# Patient Record
Sex: FEMALE | Race: BLACK OR AFRICAN AMERICAN | NOT HISPANIC OR LATINO | Employment: OTHER | URBAN - METROPOLITAN AREA
[De-identification: names, ages, dates, MRNs, and addresses within clinical notes are randomized per-mention and may not be internally consistent; named-entity substitution may affect disease eponyms.]

---

## 2023-12-12 ENCOUNTER — OFFICE VISIT (OUTPATIENT)
Dept: OBSTETRICS AND GYNECOLOGY | Facility: CLINIC | Age: 78
End: 2023-12-12
Payer: MEDICARE

## 2023-12-12 ENCOUNTER — TELEPHONE (OUTPATIENT)
Dept: OBSTETRICS AND GYNECOLOGY | Facility: CLINIC | Age: 78
End: 2023-12-12

## 2023-12-12 VITALS
HEIGHT: 63 IN | SYSTOLIC BLOOD PRESSURE: 120 MMHG | DIASTOLIC BLOOD PRESSURE: 60 MMHG | BODY MASS INDEX: 26.4 KG/M2 | WEIGHT: 149 LBS

## 2023-12-12 DIAGNOSIS — Z12.31 ENCOUNTER FOR SCREENING MAMMOGRAM FOR MALIGNANT NEOPLASM OF BREAST: ICD-10-CM

## 2023-12-12 DIAGNOSIS — Z01.419 ENCOUNTER FOR GYNECOLOGICAL EXAMINATION WITHOUT ABNORMAL FINDING: Primary | ICD-10-CM

## 2023-12-12 PROCEDURE — 1036F TOBACCO NON-USER: CPT | Performed by: OBSTETRICS & GYNECOLOGY

## 2023-12-12 PROCEDURE — 1159F MED LIST DOCD IN RCRD: CPT | Performed by: OBSTETRICS & GYNECOLOGY

## 2023-12-12 PROCEDURE — 99214 OFFICE O/P EST MOD 30 MIN: CPT | Performed by: OBSTETRICS & GYNECOLOGY

## 2023-12-12 RX ORDER — NAPROXEN SODIUM 220 MG/1
TABLET, FILM COATED ORAL EVERY 24 HOURS
COMMUNITY

## 2023-12-12 RX ORDER — CLOTRIMAZOLE AND BETAMETHASONE DIPROPIONATE 10; .64 MG/G; MG/G
CREAM TOPICAL 2 TIMES DAILY
COMMUNITY
Start: 2021-12-07

## 2023-12-12 RX ORDER — DENOSUMAB 60 MG/ML
INJECTION SUBCUTANEOUS
COMMUNITY
Start: 2017-12-11

## 2023-12-12 RX ORDER — CETIRIZINE HYDROCHLORIDE 10 MG/1
10 TABLET ORAL
COMMUNITY
Start: 2023-09-10

## 2023-12-12 RX ORDER — VIT C/E/ZN/COPPR/LUTEIN/ZEAXAN 250MG-90MG
1000 CAPSULE ORAL
COMMUNITY

## 2023-12-12 RX ORDER — GARLIC 1000 MG
1 CAPSULE ORAL
COMMUNITY

## 2023-12-12 RX ORDER — SIMVASTATIN 20 MG/1
20 TABLET, FILM COATED ORAL
COMMUNITY
Start: 2023-12-05 | End: 2024-03-04

## 2023-12-12 RX ORDER — AMLODIPINE AND BENAZEPRIL HYDROCHLORIDE 10; 20 MG/1; MG/1
1 CAPSULE ORAL
COMMUNITY
Start: 2013-09-12 | End: 2024-03-04

## 2023-12-12 RX ORDER — TRIAMCINOLONE ACETONIDE 1 MG/G
OINTMENT TOPICAL
COMMUNITY
Start: 2023-09-10

## 2023-12-12 RX ORDER — DILTIAZEM HYDROCHLORIDE 240 MG/1
240 CAPSULE, COATED, EXTENDED RELEASE ORAL
COMMUNITY
Start: 2023-12-05 | End: 2024-03-04

## 2023-12-12 RX ORDER — HYDROXYZINE HYDROCHLORIDE 25 MG/1
TABLET, FILM COATED ORAL
COMMUNITY

## 2023-12-12 ASSESSMENT — ENCOUNTER SYMPTOMS
OCCASIONAL FEELINGS OF UNSTEADINESS: 0
LOSS OF SENSATION IN FEET: 0
DEPRESSION: 0

## 2023-12-17 NOTE — PROGRESS NOTES
Subjective   Heidi Evans is a 78 y.o. female here for GYN care.   Current complaints: She has a history of osteoporosis that is managed with Prolia.  Her last dose was July 17, 2023.  She has a bone density in April 2023 that shows a T-score of the left hip -2.4.  It had increased 4.4% compared to the prior exam.  Her spine had T-score is -1.8 which is a 19.1% increase.    She has no postmenopausal bleeding or pelvic pain.  No dysuria, no change in bowel habits or vaginal discharge.    She is current on her colonoscopy.  She has a history of a hysterectomy.  We discussed that she is also helping to care for her 97-year-old mother.. Personal health questionnaire reviewed: yes.     Gynecologic History  No LMP recorded (lmp unknown). Patient is postmenopausal.  Contraception: status post hysterectomy  Last Pap: n/a. Results were: normal  Last mammogram: 4/27/23. Results were: normal    Obstetric History  OB History   No obstetric history on file.       Objective   Constitutional: Alert and in no acute distress. Well developed, well nourished.   Head and Face: Head and face: Normal.    Eyes: Normal external exam - nonicteric sclera, extraocular movements intact (EOMI) and no ptosis.   Neck: No neck asymmetry. Supple. Thyroid not enlarged and there were no palpable thyroid nodules.    Pulmonary: No respiratory distress.   Chest: Breasts: Normal appearance, no nipple discharge and no skin changes. Palpation of breasts and axillae: No palpable mass and no axillary lymphadenopathy.   Abdomen: Soft nontender; no abdominal mass palpated. No organomegaly. No hernias.   Genitourinary: External genitalia: Normal. No inguinal lymphadenopathy. Bartholin's Urethral and Skenes Glands: Normal. Urethra: Normal.  Bladder: Normal on palpation. Vagina: Normal. Right Adnexa/parametria: Normal.  Left Adnexa/parametria: Normal.  Inspection of Perianal Area: Normal.   Musculoskeletal: No joint swelling seen, normal movements of all  extremities.   Skin: Normal skin color and pigmentation, normal skin turgor, and no rash.   Neurologic: Non-focal. Grossly intact.   Psychiatric: Alert and oriented x 3. Affect normal to patient baseline. Mood: Appropriate.  Physical Exam     Assessment/Plan   This is a 78-year-old female with osteoporosis.  She will be due for her Prolia in January 2024.  Her bone density test is due in April 2025.  I will see her in 1 year.    Education reviewed: self breast exams.

## 2024-01-19 ENCOUNTER — APPOINTMENT (OUTPATIENT)
Dept: OBSTETRICS AND GYNECOLOGY | Facility: CLINIC | Age: 79
End: 2024-01-19
Payer: MEDICARE

## 2024-01-23 ENCOUNTER — LAB (OUTPATIENT)
Dept: LAB | Facility: LAB | Age: 79
End: 2024-01-23
Payer: MEDICARE

## 2024-01-23 ENCOUNTER — PROCEDURE VISIT (OUTPATIENT)
Dept: OBSTETRICS AND GYNECOLOGY | Facility: CLINIC | Age: 79
End: 2024-01-23
Payer: MEDICARE

## 2024-01-23 DIAGNOSIS — M81.0 AGE RELATED OSTEOPOROSIS, UNSPECIFIED PATHOLOGICAL FRACTURE PRESENCE: Primary | ICD-10-CM

## 2024-01-23 DIAGNOSIS — M81.0 AGE RELATED OSTEOPOROSIS, UNSPECIFIED PATHOLOGICAL FRACTURE PRESENCE: ICD-10-CM

## 2024-01-23 DIAGNOSIS — Z12.31 VISIT FOR SCREENING MAMMOGRAM: ICD-10-CM

## 2024-01-23 LAB
25(OH)D3 SERPL-MCNC: 80 NG/ML (ref 30–100)
ANION GAP SERPL CALC-SCNC: 14 MMOL/L (ref 10–20)
BUN SERPL-MCNC: 17 MG/DL (ref 6–23)
CALCIUM SERPL-MCNC: 10 MG/DL (ref 8.6–10.6)
CHLORIDE SERPL-SCNC: 104 MMOL/L (ref 98–107)
CO2 SERPL-SCNC: 27 MMOL/L (ref 21–32)
CREAT SERPL-MCNC: 0.79 MG/DL (ref 0.5–1.05)
EGFRCR SERPLBLD CKD-EPI 2021: 77 ML/MIN/1.73M*2
GLUCOSE SERPL-MCNC: 94 MG/DL (ref 74–99)
POTASSIUM SERPL-SCNC: 4.6 MMOL/L (ref 3.5–5.3)
SODIUM SERPL-SCNC: 140 MMOL/L (ref 136–145)

## 2024-01-23 PROCEDURE — 80048 BASIC METABOLIC PNL TOTAL CA: CPT

## 2024-01-23 PROCEDURE — 96372 THER/PROPH/DIAG INJ SC/IM: CPT | Performed by: OBSTETRICS & GYNECOLOGY

## 2024-01-23 PROCEDURE — 36415 COLL VENOUS BLD VENIPUNCTURE: CPT

## 2024-01-23 PROCEDURE — 82306 VITAMIN D 25 HYDROXY: CPT

## 2024-01-23 NOTE — PROGRESS NOTES
Prolia 60 mg given left upper arm subQ lot [id number] exp 5/31/2026. Pt tolerated well, cb

## 2024-04-29 ENCOUNTER — HOSPITAL ENCOUNTER (OUTPATIENT)
Dept: RADIOLOGY | Facility: HOSPITAL | Age: 79
Discharge: HOME | End: 2024-04-29
Payer: MEDICARE

## 2024-04-29 VITALS — WEIGHT: 149 LBS | HEIGHT: 63 IN | BODY MASS INDEX: 26.4 KG/M2

## 2024-04-29 DIAGNOSIS — Z12.31 ENCOUNTER FOR SCREENING MAMMOGRAM FOR MALIGNANT NEOPLASM OF BREAST: ICD-10-CM

## 2024-04-29 DIAGNOSIS — Z01.419 ENCOUNTER FOR GYNECOLOGICAL EXAMINATION WITHOUT ABNORMAL FINDING: ICD-10-CM

## 2024-04-29 PROCEDURE — 77067 SCR MAMMO BI INCL CAD: CPT

## 2024-04-29 PROCEDURE — 77067 SCR MAMMO BI INCL CAD: CPT | Performed by: RADIOLOGY

## 2024-04-29 PROCEDURE — 77063 BREAST TOMOSYNTHESIS BI: CPT | Performed by: RADIOLOGY

## 2024-04-30 ENCOUNTER — HOSPITAL ENCOUNTER (OUTPATIENT)
Dept: RADIOLOGY | Facility: EXTERNAL LOCATION | Age: 79
Discharge: HOME | End: 2024-04-30
Payer: MEDICARE

## 2024-05-28 ENCOUNTER — HOSPITAL ENCOUNTER (OUTPATIENT)
Dept: RADIOLOGY | Facility: HOSPITAL | Age: 79
Discharge: HOME | End: 2024-05-28
Payer: MEDICARE

## 2024-05-28 ENCOUNTER — OFFICE VISIT (OUTPATIENT)
Dept: ORTHOPEDIC SURGERY | Facility: HOSPITAL | Age: 79
End: 2024-05-28
Payer: MEDICARE

## 2024-05-28 DIAGNOSIS — M25.512 LEFT SHOULDER PAIN, UNSPECIFIED CHRONICITY: Primary | ICD-10-CM

## 2024-05-28 DIAGNOSIS — M75.82 ROTATOR CUFF TENDONITIS, LEFT: ICD-10-CM

## 2024-05-28 DIAGNOSIS — M25.512 LEFT SHOULDER PAIN, UNSPECIFIED CHRONICITY: ICD-10-CM

## 2024-05-28 PROCEDURE — 73030 X-RAY EXAM OF SHOULDER: CPT | Mod: LEFT SIDE | Performed by: RADIOLOGY

## 2024-05-28 PROCEDURE — 99203 OFFICE O/P NEW LOW 30 MIN: CPT | Performed by: FAMILY MEDICINE

## 2024-05-28 PROCEDURE — 73030 X-RAY EXAM OF SHOULDER: CPT | Mod: LT

## 2024-05-28 PROCEDURE — 99213 OFFICE O/P EST LOW 20 MIN: CPT | Performed by: FAMILY MEDICINE

## 2024-05-28 NOTE — PROGRESS NOTES
Sports Medicine Office Note    Today's Date:  05/28/2024     HPI: Heidi vEans is a 78 y.o. RHD retired female who presents today for evaluation of left shoulder pain.      Today, 5/28/2024, she presents for evaluation of posterior lateral left shoulder pain that began approximately 6 weeks ago when she was helping her 98-year-old mother move homes.  She denies direct injury or trauma.  She has been treating it with Epsom salt bath soaks.  She denies taking any medications for this including APAP or ibuprofen.  She did see her PCP 1 week ago but there was no treatment for this given.  She denies any neck or right shoulder pain.  She denies radicular symptoms.    She has no other complaints.    Physical Examination:     The LEFT shoulder is without obvious signs of acute bony deformity, swelling, erythema or ecchymosis. There is no tenderness along the joint line. There is no tenderness at the AC joint. There is no tenderness at the SC joint. Active range of motion is nearly full, pain-free and symmetrical. Passive range of motion is full, pain-free and symmetrical. Impingement signs are positive with nears test, Garcia and crossover. Instability tests are negative with anterior and posterior drawer, sulcus, and apprehension with relocation test. Speeds test is negative. [place]'s test is negative. Rotator cuff strength is mildly weak with supraspinatus but otherwise full and pain-free. The neck and opposite shoulder are otherwise normal and stable.    Imaging:  Radiographs of the left shoulder obtained today were reviewed and revealed mild narrowing of the subacromial space.  There are no signs of acute fractures or dislocations.  The studies were reviewed by me personally in the office today.    Problem List Items Addressed This Visit    None  Visit Diagnoses         Codes    Left shoulder pain, unspecified chronicity    -  Primary M25.512    Relevant Orders    XR shoulder left 2+ views    Rotator cuff  tendonitis, left     M75.82    Relevant Orders    Referral to Physical Therapy            Assessment and Plan:     We reviewed the exam and x-ray findings and discussed the conservative and surgical treatment options. We agreed to treat her mild rotator cuff impingement and tendinitis conservatively at this time with a referral to physical therapy.  She can take prescription doses of APAP.  We will defer advanced imaging or injections to follow-up as needed.  She will return in 6 weeks for recheck.    **This note was dictated using Dragon speech recognition software and was not corrected for spelling or grammatical errors**.    Ludwin Black MD  Sports Medicine Specialist  [facility] [place]

## 2024-05-28 NOTE — LETTER
May 28, 2024       No Recipients    Patient: Heidi Evans   YOB: 1945   Date of Visit: 5/28/2024       Dear Dr. Sanderson Recipients:    Thank you for referring Heidi Evans to me for evaluation. Below are my notes for this consultation.  If you have questions, please do not hesitate to call me. I look forward to following your patient along with you.       Sincerely,     Ludwin Black MD      CC:   No Recipients  ______________________________________________________________________________________    Sports Medicine Office Note    Today's Date:  05/28/2024     HPI: Heidi Evans is a 78 y.o. RHD retired female who presents today for evaluation of left shoulder pain.      Today, 5/28/2024, she presents for evaluation of posterior lateral left shoulder pain that began approximately 6 weeks ago when she was helping her 98-year-old mother move homes.  She denies direct injury or trauma.  She has been treating it with Epsom salt bath soaks.  She denies taking any medications for this including APAP or ibuprofen.  She did see her PCP 1 week ago but there was no treatment for this given.  She denies any neck or right shoulder pain.  She denies radicular symptoms.    She has no other complaints.    Physical Examination:     The LEFT shoulder is without obvious signs of acute bony deformity, swelling, erythema or ecchymosis. There is no tenderness along the joint line. There is no tenderness at the AC joint. There is no tenderness at the SC joint. Active range of motion is nearly full, pain-free and symmetrical. Passive range of motion is full, pain-free and symmetrical. Impingement signs are positive with nears test, Garcia and crossover. Instability tests are negative with anterior and posterior drawer, sulcus, and apprehension with relocation test. Speeds test is negative. [place]'s test is negative. Rotator cuff strength is mildly weak with supraspinatus but otherwise full and pain-free. The neck and  opposite shoulder are otherwise normal and stable.    Imaging:  Radiographs of the left shoulder obtained today were reviewed and revealed mild narrowing of the subacromial space.  There are no signs of acute fractures or dislocations.  The studies were reviewed by me personally in the office today.    Problem List Items Addressed This Visit    None  Visit Diagnoses         Codes    Left shoulder pain, unspecified chronicity    -  Primary M25.512    Relevant Orders    XR shoulder left 2+ views    Rotator cuff tendonitis, left     M75.82    Relevant Orders    Referral to Physical Therapy            Assessment and Plan:     We reviewed the exam and x-ray findings and discussed the conservative and surgical treatment options. We agreed to treat her mild rotator cuff impingement and tendinitis conservatively at this time with a referral to physical therapy.  She can take prescription doses of APAP.  We will defer advanced imaging or injections to follow-up as needed.  She will return in 6 weeks for recheck.    **This note was dictated using Dragon speech recognition software and was not corrected for spelling or grammatical errors**.    Ludwin Black MD  Sports Medicine Specialist  [facility] [place]

## 2024-06-11 ENCOUNTER — EVALUATION (OUTPATIENT)
Dept: OCCUPATIONAL THERAPY | Facility: HOSPITAL | Age: 79
End: 2024-06-11
Payer: MEDICARE

## 2024-06-11 DIAGNOSIS — M75.82 ROTATOR CUFF TENDONITIS, LEFT: Primary | ICD-10-CM

## 2024-06-11 PROCEDURE — 97165 OT EVAL LOW COMPLEX 30 MIN: CPT | Mod: GO

## 2024-06-11 PROCEDURE — 97110 THERAPEUTIC EXERCISES: CPT | Mod: GO

## 2024-06-11 ASSESSMENT — ENCOUNTER SYMPTOMS
DEPRESSION: 0
OCCASIONAL FEELINGS OF UNSTEADINESS: 0
LOSS OF SENSATION IN FEET: 0

## 2024-06-20 ENCOUNTER — TREATMENT (OUTPATIENT)
Dept: OCCUPATIONAL THERAPY | Facility: HOSPITAL | Age: 79
End: 2024-06-20
Payer: MEDICARE

## 2024-06-20 DIAGNOSIS — M75.82 ROTATOR CUFF TENDONITIS, LEFT: ICD-10-CM

## 2024-06-20 PROCEDURE — 97110 THERAPEUTIC EXERCISES: CPT | Mod: GO

## 2024-06-20 PROCEDURE — 97140 MANUAL THERAPY 1/> REGIONS: CPT | Mod: GO

## 2024-06-20 NOTE — PROGRESS NOTES
"Occupational Therapy Treatment    Patient Name: Heidi Evans  MRN: [id number]  Today's Date: 6/20/2024  Time Calculation  Start Time: 1055  Stop Time: 1145  Time Calculation (min): 50 min    Onset 3/1/24    Insurance  Visit 2 of [place]  Authorization: not required  Insurance plan: [employer] Medicare certification: 6/11/24 - 9/3/24    Assessment: Heidi notices some improvement in her L shoulder symptoms since starting OT and demonstrated better abduction  Plan: therapeutic exercise, therapeutic activity, moist heat, manual therapy, neuromuscular coordination, vasopneumatic, kesiotaping  Goals  In 6-8 weeks, Heidi will achieve the following goals  She will be able to perform self-care, household, work, and leisure tasks with lower pain (0-1/10), 75% of the time.    She will attain 5/5 left shoulder strength in order to fully perform self-care, household, work, and leisure tasks.    She will attain 5/5 left elbow/forearm strength in order to fully perform self-care, household, work and or leisure tasks.     Patient's goals for therapy: Get rid of the L shoulder pain because she had no pain before    Plan of care was developed with input and agreement by the patient  Frequency: 1 x Week  Duration: 6 Weeks    Subjective   Somewhat better, sore over lateral upper arm. Hasn't felt anything in elbow/forearm recently.    Pain  Soreness with self-palpation of lateral upper arm 3/10    Objective   Outcome Measure: QuickDASH: 0    Edema: none    Sensation  WNL    Neuro exam: TBE  Radial nerve: 5/5 strength in thumb extension, sensation intact to dorsal  surface of thumb/index web space  Median nerve: 5/5 strength in thumb abduction and opposition, sensation  intact to palmar surface of index finger  Ulnar nerve: 5/5 strength in thumb/little finger approximation, sensation  intact to palmar surface of little finger     AROM  Shoulder: degrees  flexion 124 bilat  extension 58 bilat   abduction L 150 \"I feel it\", R " "158  external rotation wnl bilat  internal rotation L T8 \"I feel it\", R T8    L elbow/wrist/forearm/hand WNL     Strength LUE  Shoulder:   abduction 4+/5  external rotation with shoulder at side 4/5  internal rotation with shoulder at side 5/5  flexion 4+/5  extension 4+/5    Elbow:  flexion 4+/5  extension 4+/5  supination 4/5  pronation 4/5    Wrist: TBE  flexion /5  extension /5  radial deviation /5  ulnar deviation /5    Hand: TBE   strength (pos 2):  L , R     Special Tests  Shoulder: From Dr. Black's note of 5/28/24: Impingement signs are positive with Neers test, Garcia and crossover. Instability tests are negative with anterior and posterior drawer, sulcus, and apprehension with relocation test. Speeds test is negative. [place]'s test is negative.     Wrist: Finkelstein's Test: L mildly positive, R negative    Treatment  Education: home exercise program, plan of care, activity modification, pain management, and pertinent anatomy     Modalities: Moist heat to L shoulder and thorax in sitting to prepare for treatment  Manual: STM and trigger point releases about L shoulder for pain relief, instructed Heidi to do the same herself.  Therapeutic Exercise: Reassessed AROM, symptoms, functional status, HEP and compliance. Heidi performed her full HEP correctly after some re-instruction/demonstration.    HEP: active scapular motion (elevation, depression, retraction, combined downward rotation and retraction), L shoulder strengthening in flexion, extension, abduction, adduction, internal rotation, and external rotation with red theraband (issued), x 2 sets of 10, 2x/day.         OT Therapeutic Procedures Time Entry  Manual Therapy Time Entry: 23  Therapeutic Exercise Time Entry: 15  OT Modalities Time Entry  Hot/Cold Pack Time Entry: 12 (not charged)         "

## 2024-06-27 ENCOUNTER — TREATMENT (OUTPATIENT)
Dept: OCCUPATIONAL THERAPY | Facility: HOSPITAL | Age: 79
End: 2024-06-27
Payer: MEDICARE

## 2024-06-27 DIAGNOSIS — M75.82 ROTATOR CUFF TENDONITIS, LEFT: ICD-10-CM

## 2024-06-27 PROCEDURE — 97110 THERAPEUTIC EXERCISES: CPT | Mod: GO

## 2024-06-27 NOTE — PROGRESS NOTES
"[Occupational Therapy Treatment    Patient Name: Heidi Evans  MRN: [id number]  Today's Date: 6/27/2024  Time Calculation  Start Time: 1055  Stop Time: 1140  Time Calculation (min): 45 min    Onset 3/1/24    Insurance  Visit 3 of MN  Authorization: not required  Insurance plan: [employer] Medicare Medicare certification: 6/11/24 - 9/3/24    Assessment: Heidi continues to notice some improvement in her L shoulder symptoms  Plan: therapeutic exercise, therapeutic activity, moist heat, manual therapy, neuromuscular coordination, vasopneumatic, kesiotaping  Goals  In 6-8 weeks, Heidi will achieve the following goals  She will be able to perform self-care, household, work, and leisure tasks with lower pain (0-1/10), 75% of the time.    She will attain 5/5 left shoulder strength in order to fully perform self-care, household, work, and leisure tasks.    She will attain 5/5 left elbow/forearm strength in order to fully perform self-care, household, work and or leisure tasks.     Patient's goals for therapy: Get rid of the L shoulder pain because she had no pain before    Plan of care was developed with input and agreement by the patient  Frequency: 1 x Week  Duration: 6 Weeks    Subjective   Better than before she started, bothers her sometimes like when she lays a certain way.    Pain  Soreness with self-palpation of lateral upper arm 3/10    Objective   Outcome Measure: QuickDASH: 0    Edema none    Sensation WNL    AROM  Shoulder: degrees  flexion 126 bilat  extension 65 bilat   abduction L 143 \"I feel it\", R 158  external rotation 72 bilat  internal rotation L T10 \"I feel it\", R T8    L elbow/wrist/forearm/hand WNL     Strength LUE  Shoulder:   abduction 4+/5  external rotation with shoulder at side 4/5  internal rotation with shoulder at side 5/5  flexion 4+/5  extension 4+/5    Elbow:  flexion 4+/5  extension 4+/5  supination 4/5  pronation 4/5    Wrist: TBE  flexion /5  extension /5  radial deviation /5  ulnar " deviation /5    Hand: TBE   strength (pos 2):  L , R     Special Tests  Shoulder: From Dr. Black's note of 5/28/24: Impingement signs are positive with Neers test, Garcia and crossover. Instability tests are negative with anterior and posterior drawer, sulcus, and apprehension with relocation test. Speeds test is negative. [place]'s test is negative.     Wrist: Finkelstein's Test: L mildly positive, R negative    Treatment  Education: home exercise program, plan of care, activity modification, pain management, and pertinent anatomy     Modalities: Moist heat to L shoulder and thorax in sitting to prepare for treatment  Therapeutic Exercise: Reassessed AROM, symptoms, functional status, HEP and compliance. Pulley for shoulder/wrist distraction and stretching in shoulder flexion, abduction, and IR. Heidi performed active scapular motions. Instructed her in countertop stretch combined with thread the needle (thoracic rotation and shoulder scaption, then horizontal adduction), added to HEP. UBE for gripping and general UE strengthening, level 2 x 2 min in each direction. Printed pulley stretches because Heidi wants to purchase one.    HEP: countertop stretch combined with thread the needle (thoracic rotation and shoulder scaption, then horizontal adduction)active scapular motion (elevation, depression, retraction, combined downward rotation and retraction), L shoulder strengthening in flexion, extension, abduction, adduction, internal rotation, and external rotation with red theraband (issued), x 2 sets of 10, 2x/day.     OT Therapeutic Procedures Time Entry  Therapeutic Exercise Time Entry: 33  OT Modalities Time Entry  Hot/Cold Pack Time Entry: 12 (not charged)

## 2024-07-03 ENCOUNTER — TREATMENT (OUTPATIENT)
Dept: OCCUPATIONAL THERAPY | Facility: HOSPITAL | Age: 79
End: 2024-07-03
Payer: MEDICARE

## 2024-07-03 DIAGNOSIS — M75.82 ROTATOR CUFF TENDONITIS, LEFT: ICD-10-CM

## 2024-07-03 PROCEDURE — 97110 THERAPEUTIC EXERCISES: CPT | Mod: GO

## 2024-07-03 NOTE — PROGRESS NOTES
"[Occupational Therapy Treatment    Patient Name: Heidi Evans  MRN: [id number]  Today's Date: 7/3/2024  Time Calculation  Start Time: 1040  Stop Time: 1115  Time Calculation (min): 35 min    Onset 3/1/24    Insurance  Visit 4 of MN  Authorization: not required  Insurance plan: [employer] Medicare Medicare certification: 6/11/24 - 9/3/24    Assessment: Heidi continues to make progress toward all goals.  Plan: therapeutic exercise, therapeutic activity, moist heat, manual therapy  Goals  In 6-8 weeks, Heidi will achieve the following goals  She will be able to perform self-care, household, work, and leisure tasks with lower pain (0-1/10), 75% of the time.    She will attain 5/5 left shoulder strength in order to fully perform self-care, household, work, and leisure tasks.    She will attain 5/5 left elbow/forearm strength in order to fully perform self-care, household, work and or leisure tasks.     Patient's goals for therapy: Get rid of the L shoulder pain because she had no pain before    Plan of care was developed with input and agreement by the patient  Frequency: 1 x Week  Duration: 6 Weeks    Subjective   Better     Pain  Soreness with self-palpation and movement of lateral upper arm 3/10    Objective   Outcome Measure: QuickDASH: 0    Edema none    Sensation WNL    AROM    Shoulder: degrees  flexion 126 bilat  extension 65 bilat   abduction L 143 \"I feel it\", R 158  external rotation 72 bilat  internal rotation L T10 \"I feel it\", R T8    L elbow/wrist/forearm/hand WNL     Strength LUE  Shoulder:   abduction 4+/5  external rotation with shoulder at side 4/5  internal rotation with shoulder at side 5/5  flexion 4+/5  extension 4+/5    Elbow:  flexion 4+/5  extension 4+/5  supination 4/5  pronation 4/5    Wrist: TBE  flexion /5  extension /5  radial deviation /5  ulnar deviation /5    Hand: TBE   strength (pos 2):  L , R     Special Tests  Shoulder: From Dr. Black's note of 5/28/24: Impingement signs are " positive with Neers test, Garcia and crossover. Instability tests are negative with anterior and posterior drawer, sulcus, and apprehension with relocation test. Speeds test is negative. [place]'s test is negative.     Wrist: Finkelstein's Test: L mildly positive, R negative    Treatment  Education: home exercise program, plan of care, activity modification, pain management, and pertinent anatomy     Modalities: Moist heat to L shoulder and thorax in sitting to prepare for treatment  Therapeutic Exercise: Reassessed symptoms, functional status, HEP and compliance.   Advanced Heidi to green theraband, with which she performed all the strengthening exercises without issue.    HEP: countertop stretch combined with thread the needle (thoracic rotation and shoulder scaption, then horizontal adduction)active scapular motion (elevation, depression, retraction, combined downward rotation and retraction), L shoulder strengthening in flexion, extension, abduction, adduction, internal rotation, and external rotation with red theraband (issued), x 2 sets of 10, 2x/day.     OT Therapeutic Procedures Time Entry  Therapeutic Exercise Time Entry: 25  OT Modalities Time Entry  Hot/Cold Pack Time Entry: 10 (not charged)

## 2024-07-09 ENCOUNTER — PROCEDURE VISIT (OUTPATIENT)
Dept: OBSTETRICS AND GYNECOLOGY | Facility: CLINIC | Age: 79
End: 2024-07-09
Payer: MEDICARE

## 2024-07-09 ENCOUNTER — OFFICE VISIT (OUTPATIENT)
Dept: ORTHOPEDIC SURGERY | Facility: HOSPITAL | Age: 79
End: 2024-07-09
Payer: MEDICARE

## 2024-07-09 VITALS — WEIGHT: 148 LBS | BODY MASS INDEX: 26.22 KG/M2 | HEIGHT: 63 IN

## 2024-07-09 DIAGNOSIS — M75.82 ROTATOR CUFF TENDONITIS, LEFT: Primary | ICD-10-CM

## 2024-07-09 DIAGNOSIS — M81.0 AGE RELATED OSTEOPOROSIS, UNSPECIFIED PATHOLOGICAL FRACTURE PRESENCE: Primary | ICD-10-CM

## 2024-07-09 PROCEDURE — 1159F MED LIST DOCD IN RCRD: CPT | Performed by: FAMILY MEDICINE

## 2024-07-09 PROCEDURE — 96372 THER/PROPH/DIAG INJ SC/IM: CPT | Performed by: OBSTETRICS & GYNECOLOGY

## 2024-07-09 PROCEDURE — 99213 OFFICE O/P EST LOW 20 MIN: CPT | Performed by: FAMILY MEDICINE

## 2024-07-09 PROCEDURE — 1036F TOBACCO NON-USER: CPT | Performed by: FAMILY MEDICINE

## 2024-07-09 PROCEDURE — 1125F AMNT PAIN NOTED PAIN PRSNT: CPT | Performed by: FAMILY MEDICINE

## 2024-07-09 ASSESSMENT — PAIN SCALES - GENERAL: PAINLEVEL_OUTOF10: 2

## 2024-07-09 ASSESSMENT — PAIN - FUNCTIONAL ASSESSMENT: PAIN_FUNCTIONAL_ASSESSMENT: 0-10

## 2024-07-09 NOTE — PROGRESS NOTES
Sports Medicine Office Note    Today's Date:  07/09/2024     HPI: Heidi Evans is a 78 y.o. RHD retired female who presents today for f/up of left shoulder pain.      On 5/28/2024, she presents for evaluation of posterior lateral left shoulder pain that began approximately 6 weeks ago when she was helping her 98-year-old mother move homes.  She denies direct injury or trauma.  She has been treating it with Epsom salt bath soaks.  She denies taking any medications for this including APAP or ibuprofen.  She did see her PCP 1 week ago but there was no treatment for this given.  She denies any neck or right shoulder pain.  She denies radicular symptoms.  We agreed to treat her mild rotator cuff impingement and tendinitis conservatively at this time with a referral to physical therapy.  She can take prescription doses of APAP.  We will defer advanced imaging or injections to follow-up as needed.  She will return in 6 weeks for recheck.    Today, 7/9/2024, she returns for follow-up of left rotator cuff tendinitis.  Overall she feels 50% better with her conservative treatment plan.  She was taking Tylenol daily for 1 to 2 weeks and got great improvement and stopped taking it.  Physical therapy is helping.  She continues this with home exercises.  She denies interval injury or trauma.  She is happy with her progress.    She has no other complaints.    Physical Examination:     The LEFT shoulder is without obvious signs of acute bony deformity, swelling, erythema or ecchymosis. There is no tenderness along the joint line. There is no tenderness at the AC joint. There is no tenderness at the SC joint. Active range of motion is nearly full, pain-free and symmetrical. Passive range of motion is full, pain-free and symmetrical. Impingement signs are mildly positive with nears test, Garcia and crossover. Instability tests are negative with anterior and posterior drawer, sulcus, and apprehension with relocation test. Speeds  test is negative. [place]'s test is negative. Rotator cuff strength is mildly weak with supraspinatus but otherwise full and pain-free. The neck and opposite shoulder are otherwise normal and stable.    Imaging:  === 05/28/24 ===  XR SHOULDER 2+ VIEWS LEFT  - Impression -  Normal radiographs of the left shoulder  Signed by: El Abbasi 5/29/2024 7:28 PM    Problem List Items Addressed This Visit    None  Visit Diagnoses         Codes    Rotator cuff tendonitis, left    -  Primary M75.82            Assessment and Plan:     We reviewed the exam and x-ray findings and discussed the conservative and surgical treatment options. We agreed that she is doing very well with our conservative treatment plan at this time.  She does not need a subacromial cortisone injection.  I did offer a short course of meloxicam and she declined.  We agreed to try topical diclofenac or maximize Tylenol to help with pain control as she continues PT.  She will follow-up in 6 to 8 weeks for recheck.    **This note was dictated using Dragon speech recognition software and was not corrected for spelling or grammatical errors**.    Ludwin Black MD  Sports Medicine Specialist  [facility] [place]

## 2024-07-17 ENCOUNTER — TREATMENT (OUTPATIENT)
Dept: OCCUPATIONAL THERAPY | Facility: HOSPITAL | Age: 79
End: 2024-07-17
Payer: MEDICARE

## 2024-07-17 DIAGNOSIS — M75.82 ROTATOR CUFF TENDONITIS, LEFT: ICD-10-CM

## 2024-07-17 PROCEDURE — 97110 THERAPEUTIC EXERCISES: CPT | Mod: GO

## 2024-07-17 NOTE — PROGRESS NOTES
"[Occupational Therapy Treatment    Patient Name: Heidi Evans  MRN: [id number]  Today's Date: 7/18/2024  Time Calculation  Start Time: 1005  Stop Time: 1030  Time Calculation (min): 25 min    Onset 3/1/24    Insurance  Visit 5 of [place]  Authorization: not required  Insurance plan: [employer] Medicare certification: 6/11/24 - 9/3/24    Assessment: Heidi continues to make progress toward all goals.  Plan: therapeutic exercise, therapeutic activity, moist heat, manual therapy  Goals  In 6-8 weeks, Heidi will achieve the following goals  She will be able to perform self-care, household, work, and leisure tasks with lower pain (0-1/10), 75% of the time.    She will attain 5/5 left shoulder strength in order to fully perform self-care, household, work, and leisure tasks.    She will attain 5/5 left elbow/forearm strength in order to fully perform self-care, household, work and or leisure tasks.     Patient's goals for therapy: Get rid of the L shoulder pain because she had no pain before    Plan of care was developed with input and agreement by the patient  Frequency: 1 x Week  Duration: 6 Weeks    Subjective   Better overall, but lingering pain especially in abd/ER, and scaption; does HEP every other day. Saw Dr. Black, who said she's doing well and to continue with OT.    Pain  Anterior shoulder and lateral upper arm 3/10    Objective   Outcome Measure: QuickDASH: 0    Edema none    Sensation WNL    AROM    Shoulder: degrees  LUE  flexion 129  extension 65    abduction L 156 \"I feel it a little\", R 158  external rotation 75 with discomfort  internal rotation L T8 \"I feel it a little\", R T8    L elbow/wrist/forearm/hand WNL     Strength LUE  Shoulder:   abduction 4+/5  external rotation with shoulder at side 4/5  internal rotation with shoulder at side 5/5  flexion 4+/5  extension 4+/5    Elbow:  flexion 4+/5  extension 4+/5  supination 4/5  pronation 4/5      Special Tests  Shoulder: From Dr. Black's note of 7/9/24: " Impingement signs are mildly positive with Neers, Garcia and crossover. Instability tests are negative with anterior and posterior drawer, sulcus, and apprehension with relocation test. Speeds test is negative. Ferdinand's test is negative. Supraspinatus is mildly weak, but rotator cuff strength is otherwise full.    Wrist: Finkelstein's Test: L mildly positive, R negative    Treatment (shortened treatment due to Heidi's late arrival due to issues with parking and the elevator)  Education: home exercise program, plan of care, activity modification, pain management, and pertinent anatomy     Therapeutic Exercise: Reassessed symptoms, functional status, HEP and compliance. UBE for gripping and general UE strengthening, level 2 x 3 min in each direction. Instructed Heidi in suprapinatus strengthening without weight, (added to HEP) which she performed correctly.    HEP: countertop stretch combined with thread the needle (thoracic rotation and shoulder scaption, then horizontal adduction)active scapular motion (elevation, depression, retraction, combined downward rotation and retraction), L shoulder strengthening in flexion, extension, abduction, adduction, internal rotation, and external rotation with red theraband (issued), supraspinatus strengthening without weight, x 2 sets of 10, 2x/day.     OT Therapeutic Procedures Time Entry  Therapeutic Exercise Time Entry: 25

## 2024-07-24 ENCOUNTER — TREATMENT (OUTPATIENT)
Dept: OCCUPATIONAL THERAPY | Facility: HOSPITAL | Age: 79
End: 2024-07-24
Payer: MEDICARE

## 2024-07-24 DIAGNOSIS — M75.82 ROTATOR CUFF TENDONITIS, LEFT: ICD-10-CM

## 2024-07-24 PROCEDURE — 97110 THERAPEUTIC EXERCISES: CPT | Mod: GO

## 2024-07-24 NOTE — PROGRESS NOTES
"[Occupational Therapy Treatment    Patient Name: Heidi Evans  MRN: [id number]  Today's Date: 7/24/2024  Time Calculation  Start Time: 0905  Stop Time: 0950  Time Calculation (min): 45 min    Onset 3/1/24    Insurance  Visit 6 of [place]  Authorization: not required  Insurance plan: [employer] Medicare certification: 6/11/24 - 9/3/24    Assessment: Heidi continues to make progress toward all goals.  Plan: focus on strengthening  Goals  In 6-8 weeks, Heidi will achieve the following goals  She will be able to perform self-care, household, work, and leisure tasks with lower pain (0-1/10), 75% of the time.    She will attain 5/5 left shoulder strength in order to fully perform self-care, household, work, and leisure tasks.    She will attain 5/5 left elbow/forearm strength in order to fully perform self-care, household, work and or leisure tasks.     Patient's goals for therapy: Get rid of the L shoulder pain because she had no pain before    Plan of care was developed with input and agreement by the patient  Frequency: 1 x Week  Duration: 6 Weeks    Subjective   Better overall, but lingering pain especially in abd and scaption in supine; does HEP every other day.     Pain  None at rest; 3/10 when lying down and reaching up    Objective   Outcome Measure: QuickDASH: 0    Edema none    Sensation WNL    AROM    Shoulder: degrees  LUE  flexion 129  extension 65    abduction L 156 \"I feel it a little\", R 158  external rotation 75 with discomfort  internal rotation L T8, R T8    L elbow/wrist/forearm/hand WNL     Strength LUE  Shoulder:   abduction 4+/5  external rotation with shoulder at side 4/5  internal rotation with shoulder at side 5/5  flexion 4+/5  extension 4+/5    Elbow:  flexion 4+/5  extension 4+/5  supination 4/5  pronation 4/5      Special Tests  Shoulder: From Dr. Black's note of 7/9/24: Impingement signs are mildly positive with Neers, Garcia and crossover. Instability tests are negative with anterior and " posterior drawer, sulcus, and apprehension with relocation test. Speeds test is negative. [place]'s test is negative. Supraspinatus is mildly weak, but rotator cuff strength is otherwise full.    Wrist: Finkelstein's Test: L mildly positive, R negative    Treatment Education: home exercise program, plan of care, activity modification, pain management, and pertinent anatomy     Therapeutic Exercise: Reassessed symptoms, functional status, HEP and compliance. UBE for gripping and general UE strengthening, level 2 x 2 min in each direction. Instructed Heidi in adding 1 lb to suprapinatus strengthening which she performed correctly. She laid on a foam roller aligned with her spine and abducted her shoulders to various degrees for chest stretches, then flexed/extended her shoulders, and abducted them, bringing her hands together above her head; she then stretched her thoracic spine over the roller, and released soft tissue over both scapulae while in sidelying over the roll. Heidi held a medium therapy ball on the floor with her LUE while I tried to knock it away from her to build shoulder stabilization strength. On the mat table she kneeled and laid her stomach on the ball and performed periscapular strengthening movements (I, T, Y positions), added to HEP as she has such a ball at home.      HEP: countertop stretch combined with thread the needle (thoracic rotation and shoulder scaption, then horizontal adduction), active scapular motion (elevation, depression, retraction, combined downward rotation and retraction), L shoulder strengthening in flexion, extension, abduction, adduction, internal rotation, and external rotation with red theraband (issued), supraspinatus strengthening with 1 lb, periscapular strengthening movements (I, T, Y positions) over therapy ball; each x 2 sets of 10, 2x/day.     OT Therapeutic Procedures Time Entry  Therapeutic Exercise Time Entry: 45

## 2024-08-02 ENCOUNTER — TREATMENT (OUTPATIENT)
Dept: OCCUPATIONAL THERAPY | Facility: HOSPITAL | Age: 79
End: 2024-08-02
Payer: MEDICARE

## 2024-08-02 DIAGNOSIS — M75.82 ROTATOR CUFF TENDONITIS, LEFT: ICD-10-CM

## 2024-08-02 PROCEDURE — 97110 THERAPEUTIC EXERCISES: CPT | Mod: GO

## 2024-08-02 NOTE — PROGRESS NOTES
"Occupational Therapy Treatment    Patient Name: Heidi Evans  MRN: [id number]  Today's Date: 8/2/2024  Time Calculation  Start Time: 0915  Stop Time: 1000  Time Calculation (min): 45 min    Onset 3/1/24    Insurance  Visit 7 of [place]  Authorization: not required  Insurance plan: [employer] Medicare certification: 6/11/24 - 9/3/24    Assessment:   Plan: focus on strengthening  Goals  In 6-8 weeks, Heidi will achieve the following goals  She will be able to perform self-care, household, work, and leisure tasks with lower pain (0-1/10), 75% of the time.    She will attain 5/5 left shoulder strength in order to fully perform self-care, household, work, and leisure tasks.    She will attain 5/5 left elbow/forearm strength in order to fully perform self-care, household, work and or leisure tasks.     Patient's goals for therapy: Get rid of the L shoulder pain because she had no pain before    Plan of care was developed with input and agreement by the patient  Frequency: 1 x Week  Duration: 6 Weeks    Subjective   Lingering pain now \"in back\". Using the ball in the sock, which helps. She hasn't done the therapy ball periscap strengthening, and she has a long foam roller, but hasn't done the stretching \"will have to get used to that\"    Pain  None at rest; 3/10 when lying down and reaching up    Objective   Outcome Measure: QuickDASH: 0    Edema none    Sensation WNL    AROM    Shoulder: degrees  LUE  flexion 129  extension 65    abduction L 156 \"I feel it a little\", R 158  external rotation 75 with discomfort  internal rotation L T8, R T8    L elbow/wrist/forearm/hand WNL     Strength LUE  Shoulder:   abduction 4+/5  external rotation with shoulder at side 4/5  internal rotation with shoulder at side 5/5  flexion 4+/5  extension 4+/5    Elbow:  flexion 4+/5  extension 4+/5  supination 4/5  pronation 4/5      Special Tests  Shoulder: From Dr. Black's note of 7/9/24: Impingement signs are mildly positive with Neers, " Garcia and crossover. Instability tests are negative with anterior and posterior drawer, sulcus, and apprehension with relocation test. Speeds test is negative. [place]'s test is negative. Supraspinatus is mildly weak, but rotator cuff strength is otherwise full.    Wrist: Finkelstein's Test: L mildly positive, R negative    Treatment Education: home exercise program, plan of care, activity modification, pain management, and pertinent anatomy     Therapeutic Exercise: Reassessed symptoms, functional status, reviewed HEP and compliance. UBE for gripping and general UE strengthening, level 2 x 2 min in each direction. Pulley for shoulder stretching in shoulder flexion, abduction, and IR with isometric holds at the top, x 10 each. Heidi performed supraspinatus strengthening by wiping the wall at 45 deg shoulder abduction with a towel until fatigue. With her back to the wall and in 90/90, she wiped the wall with the back of her L hand on a towel. I issued a handout and explained many stretches and arm movements she can do on her long foam roller, and she rolled a foam roller up the wall with bilateral forearms until fatigue.    HEP: countertop stretch combined with thread the needle (thoracic rotation and shoulder scaption, then horizontal adduction), active scapular motion (elevation, depression, retraction, combined downward rotation and retraction), L shoulder strengthening in flexion, extension, abduction, adduction, internal rotation, and external rotation with red theraband, supraspinatus strengthening with 1 lb, periscapular strengthening movements (I, T, Y positions) over therapy ball; each x 2 sets of 10, 2x/day.     OT Therapeutic Procedures Time Entry  Therapeutic Exercise Time Entry: 35  OT Modalities Time Entry  Hot/Cold Pack Time Entry: 10 (not charged)

## 2024-08-09 ENCOUNTER — TREATMENT (OUTPATIENT)
Dept: OCCUPATIONAL THERAPY | Facility: HOSPITAL | Age: 79
End: 2024-08-09
Payer: MEDICARE

## 2024-08-09 DIAGNOSIS — M75.82 ROTATOR CUFF TENDONITIS, LEFT: ICD-10-CM

## 2024-08-09 PROCEDURE — 97110 THERAPEUTIC EXERCISES: CPT | Mod: GO

## 2024-08-09 NOTE — PROGRESS NOTES
"Occupational Therapy Treatment    Patient Name: Heidi Evans  MRN: [id number]  Today's Date: 8/9/2024  Time Calculation  Start Time: 0910  Stop Time: 0955  Time Calculation (min): 45 min    Onset 3/1/24    Insurance  Visit 7 of [place]  Authorization: not required  Insurance plan: [employer] Medicare  Medicare certification: 6/11/24 - 9/3/24    Assessment: Heidi still has pain during manual muscle testing and our goal is for her not to have pain. Increased strength in some shoulder and elbow motions.    Plan: focus on strengthening  Goals  In 6-8 weeks, Heidi will achieve the following goals  She will be able to perform self-care, household, work, and leisure tasks with lower pain (0-1/10), 75% of the time.    She will attain 5/5 left shoulder strength in order to fully perform self-care, household, work, and leisure tasks.    She will attain 5/5 left elbow/forearm strength in order to fully perform self-care, household, work and or leisure tasks.     Patient's goals for therapy: Get rid of the L shoulder pain because she had no pain before    Plan of care was developed with input and agreement by the patient  Frequency: 1 x Week  Duration: 6 Weeks    Subjective   \"This therapy has helped me so much\". Plans to order pulley and arm bike for herself. Hasn't done foam roller stretches yet.    Pain  None at rest; 3/10 when lying down and reaching up    Objective   Outcome Measure: QuickDASH: 0    Edema none    Sensation WNL    AROM    Shoulder: degrees  LUE  flexion 129  extension 65    abduction L 156 \"I feel it a little\", R 158  external rotation 75 with discomfort  internal rotation L T8, R T8    L elbow/wrist/forearm/hand WNL     Strength LUE  Shoulder:   abduction 4+/5  external rotation with shoulder at side 4+/5  internal rotation with shoulder at side 5/5  flexion 4+/5  extension 4+/5    Elbow:  flexion 5/5  extension 4+/5  supination 4+/5  pronation 4+/5      Special Tests  Shoulder: From Dr. Black's note of 7/9/24: " Impingement signs are mildly positive with Neers, Garcia and crossover. Instability tests are negative with anterior and posterior drawer, sulcus, and apprehension with relocation test. Speeds test is negative. [place]'s test is negative. Supraspinatus is mildly weak, but rotator cuff strength is otherwise full.    Wrist: Finkelstein's Test: L mildly positive, R negative    Treatment Education: home exercise program, plan of care, activity modification, pain management, and pertinent anatomy     Modalities: Moist heat to L shoulder, thorax in sitting to prepare for treatment  Therapeutic Exercise: Reassessed AROM, strength, symptoms, functional status, reviewed HEP and compliance. UBE for gripping and general UE strengthening, level 3 x 2 min in each direction. Pulley for shoulder stretching in shoulder flexion, abduction, and IR with isometric holds at the top, x 10 each. Heidi threw overhead and chest passes into rebounder with 1 lb ball emphasizing wrist extension and flexion until fatigue in standing and , standing sideways, kneeling and kneeling sideways      HEP: countertop stretch combined with thread the needle (thoracic rotation and shoulder scaption, then horizontal adduction), active scapular motion (elevation, depression, retraction, combined downward rotation and retraction), L shoulder strengthening in flexion, extension, abduction, adduction, internal rotation, and external rotation with red theraband, supraspinatus strengthening with 1 lb, periscapular strengthening movements (I, T, Y positions) over therapy ball, foam roller stretches; each x 2 sets of 10, 2x/day.     OT Therapeutic Procedures Time Entry  Therapeutic Exercise Time Entry: 35  OT Modalities Time Entry  Hot/Cold Pack Time Entry: 10 (not charged)

## 2024-08-13 ENCOUNTER — TREATMENT (OUTPATIENT)
Dept: OCCUPATIONAL THERAPY | Facility: HOSPITAL | Age: 79
End: 2024-08-13
Payer: MEDICARE

## 2024-08-13 DIAGNOSIS — M75.82 ROTATOR CUFF TENDONITIS, LEFT: ICD-10-CM

## 2024-08-13 PROCEDURE — 97140 MANUAL THERAPY 1/> REGIONS: CPT | Mod: GO

## 2024-08-13 NOTE — PROGRESS NOTES
"Occupational Therapy Treatment    Patient Name: Heidi Evans  MRN: [id number]  Today's Date: 8/13/2024  Time Calculation  Start Time: 0940  Stop Time: 1000  Time Calculation (min): 20 min    Onset 3/1/24    Insurance  Visit 8 of [place]  Authorization: not required  Insurance plan: [employer] Medicare Medicare certification: 6/11/24 - 9/3/24    Assessment: Heidi still has intermittent pain in the L GH joint with certain motions and in bed    Plan: focus on strengthening  Goals  In 6-8 weeks, Heidi will achieve the following goals  She will be able to perform self-care, household, work, and leisure tasks with lower pain (0-1/10), 75% of the time.    She will attain 5/5 left shoulder strength in order to fully perform self-care, household, work, and leisure tasks.    She will attain 5/5 left elbow/forearm strength in order to fully perform self-care, household, work and or leisure tasks.     Patient's goals for therapy: Get rid of the L shoulder pain because she had no pain before    Plan of care was developed with input and agreement by the patient  Frequency: 1 x Week  Duration: 6 Weeks    Subjective   Stuck in elevator, 25 min late    Pain  None at rest; 3/10 when lying down and reaching up    Objective   Outcome Measure: QuickDASH: 0    Edema none    Sensation WNL    AROM    Shoulder: degrees  LUE  flexion 129  extension 65    abduction L 156 \"I feel it a little\", R 158  external rotation 75 with discomfort  internal rotation L T8, R T8    L elbow/wrist/forearm/hand WNL     Strength LUE  Shoulder:   abduction 4+/5  external rotation with shoulder at side 4+/5  internal rotation with shoulder at side 5/5  flexion 4+/5  extension 4+/5    Elbow:  flexion 5/5  extension 4+/5  supination 4+/5  pronation 4+/5      Special Tests  Shoulder: From Dr. Black's note of 7/9/24: Impingement signs are mildly positive with Neers, Garcia and crossover. Instability tests are negative with anterior and posterior drawer, sulcus, and " apprehension with relocation test. Speeds test is negative. [place]'s test is negative. Supraspinatus is mildly weak, but rotator cuff strength is otherwise full.    Wrist: Finkelstein's Test: L mildly positive, R negative    Treatment SHORTENED SESSION DUE TO PRITI HAVING BEEN STUCK IN THE ELEVATOR  Education: home exercise program, plan of care, activity modification, pain management, and pertinent anatomy     Modalities: Moist heat to L shoulder in sitting after treatment for pain relief  Therapeutic Exercise: UBE for gripping and general UE strengthening, level 3 x 2 min in each direction.   Manual: Trigger point releases about L shoulder for pain relief and increased ROM    HEP: countertop stretch combined with thread the needle (thoracic rotation and shoulder scaption, then horizontal adduction), active scapular motion (elevation, depression, retraction, combined downward rotation and retraction), L shoulder strengthening in flexion, extension, abduction, adduction, internal rotation, and external rotation with red theraband, supraspinatus strengthening with 1 lb, periscapular strengthening movements (I, T, Y positions) over therapy ball, foam roller stretches; each x 2 sets of 10, 2x/day.     OT Therapeutic Procedures Time Entry  Manual Therapy Time Entry: 10  Therapeutic Exercise Time Entry: 10

## 2024-08-21 ENCOUNTER — OFFICE VISIT (OUTPATIENT)
Dept: ORTHOPEDIC SURGERY | Facility: HOSPITAL | Age: 79
End: 2024-08-21
Payer: MEDICARE

## 2024-08-21 DIAGNOSIS — M75.82 ROTATOR CUFF TENDONITIS, LEFT: Primary | ICD-10-CM

## 2024-08-21 PROCEDURE — 99213 OFFICE O/P EST LOW 20 MIN: CPT | Performed by: FAMILY MEDICINE

## 2024-08-21 PROCEDURE — 1159F MED LIST DOCD IN RCRD: CPT | Performed by: FAMILY MEDICINE

## 2024-08-21 ASSESSMENT — PAIN DESCRIPTION - DESCRIPTORS: DESCRIPTORS: SHARP

## 2024-08-21 ASSESSMENT — PAIN - FUNCTIONAL ASSESSMENT: PAIN_FUNCTIONAL_ASSESSMENT: 0-10

## 2024-08-21 NOTE — PROGRESS NOTES
Sports Medicine Office Note    Today's Date:  08/21/2024     HPI: Heidi Evans is a 78 y.o. RHD retired female who presents today for f/up of left shoulder pain.      On 5/28/2024, she presents for evaluation of posterior lateral left shoulder pain that began approximately 6 weeks ago when she was helping her 98-year-old mother move homes.  She denies direct injury or trauma.  She has been treating it with Epsom salt bath soaks.  She denies taking any medications for this including APAP or ibuprofen.  She did see her PCP 1 week ago but there was no treatment for this given.  She denies any neck or right shoulder pain.  She denies radicular symptoms.  We agreed to treat her mild rotator cuff impingement and tendinitis conservatively at this time with a referral to physical therapy.  She can take prescription doses of APAP.  We will defer advanced imaging or injections to follow-up as needed.  She will return in 6 weeks for recheck.    On 7/9/2024, she returns for follow-up of left rotator cuff tendinitis.  Overall she feels 50% better with her conservative treatment plan.  She was taking Tylenol daily for 1 to 2 weeks and got great improvement and stopped taking it.  Physical therapy is helping.  She continues this with home exercises.  She denies interval injury or trauma.  She is happy with her progress.  We agreed that she is doing very well with our conservative treatment plan at this time.  She does not need a subacromial cortisone injection.  I did offer a short course of meloxicam and she declined.  We agreed to try topical diclofenac or maximize Tylenol to help with pain control as she continues PT.  She will follow-up in 6 to 8 weeks for recheck.    Today, 8/21/2024, she returns for follow-up of left rotator cuff tendinitis.  She reports that she is 60 to 70% better since her last visit.  She has been doing physical therapy and home exercises.  She has her next therapy visit tomorrow.  She states she  is not taking any medications because the pain is not bad enough.  She has no problems with ADLs.  She denies interval injury or trauma.    She has no other complaints.    Physical Examination:     The LEFT shoulder is without obvious signs of acute bony deformity, swelling, erythema or ecchymosis. There is no tenderness along the joint line. There is no tenderness at the AC joint. There is no tenderness at the SC joint. Active range of motion is nearly full, pain-free and symmetrical. Passive range of motion is full, pain-free and symmetrical. Impingement signs are mildly positive with nears test, Garcia and crossover. Instability tests are negative with anterior and posterior drawer, sulcus, and apprehension with relocation test. Speeds test is negative. [place]'s test is negative. Rotator cuff strength is mildly weak with supraspinatus but otherwise full and pain-free. The neck and opposite shoulder are otherwise normal and stable.    Imaging:  === 05/28/24 ===  XR SHOULDER 2+ VIEWS LEFT  - Impression -  Normal radiographs of the left shoulder  Signed by: El Abbasi 5/29/2024 7:28 PM    Problem List Items Addressed This Visit    None  Visit Diagnoses         Codes    Rotator cuff tendonitis, left    -  Primary M75.82            Assessment and Plan:     We reviewed the exam and x-ray findings and discussed the conservative and surgical treatment options. We agreed That she is doing better with our conservative treatment plan.  We agreed she does not need a subacromial cortisone injection at this time or any other treatment alterations.  She will continue her current plan with physical therapy.  I encouraged her to take APAP and use topical diclofenac as needed.  Follow-up in 6 to 8 weeks if her symptoms persist or worsen.    **This note was dictated using Dragon speech recognition software and was not corrected for spelling or grammatical errors**.    Ludwin Black MD  Sports Medicine Specialist  [facility] [place]

## 2024-08-22 ENCOUNTER — TREATMENT (OUTPATIENT)
Dept: OCCUPATIONAL THERAPY | Facility: HOSPITAL | Age: 79
End: 2024-08-22
Payer: MEDICARE

## 2024-08-22 DIAGNOSIS — M75.82 ROTATOR CUFF TENDONITIS, LEFT: ICD-10-CM

## 2024-08-22 PROCEDURE — 97140 MANUAL THERAPY 1/> REGIONS: CPT | Mod: GO

## 2024-08-22 NOTE — PROGRESS NOTES
"Occupational Therapy Treatment    Patient Name: Heidi Evans  MRN: [id number]  Today's Date: 8/22/2024  Time Calculation  Start Time: 0920  Stop Time: 1010  Time Calculation (min): 50 min    Onset 3/1/24    Insurance  Visit 9 of [place]  Authorization: not required  Insurance plan: [employer] Medicare certification: 6/11/24 - 9/3/24    Assessment: Heidi still has intermittent pain in the L GH joint with certain motions and in bed    Plan: focus on strengthening  Goals  In 6-8 weeks, Heidi will achieve the following goals  She will be able to perform self-care, household, work, and leisure tasks with lower pain (0-1/10), 75% of the time.    She will attain 5/5 left shoulder strength in order to fully perform self-care, household, work, and leisure tasks.    She will attain 5/5 left elbow/forearm strength in order to fully perform self-care, household, work and or leisure tasks.     Patient's goals for therapy: Get rid of the L shoulder pain because she had no pain before    Plan of care was developed with input and agreement by the patient  Frequency: 1 x Week  Duration: 6 Weeks    Subjective   Less pain, but still some. Saw Dr. Black who said she's doing well, does not need to follow up if she is not feeling pain by end of September, and to continue with OT. Has not done the foam roller or exercise ball stretches. Golf ball trigger point releases help.    Pain  None at rest; 3/10 when lying down and reaching up    Objective   Outcome Measure: QuickDASH: 0    Edema none    Sensation WNL    AROM    Shoulder: degrees  LUE  flexion 134  extension 65    abduction L 156 \"I feel it a little\", R 158  external rotation 75 with discomfort  internal rotation L T8, R T8    L elbow/wrist/forearm/hand WNL     Strength LUE  Shoulder:   abduction 4+/5  external rotation with shoulder at side 4+/5  internal rotation with shoulder at side 5/5  flexion 4+/5  extension 4+/5    Elbow:  flexion 5/5  extension 4+/5  supination " Patient called- informed lab work looks normal & there are no concerns. Per Dr Velasquez.   4+/5  pronation 4+/5      Special Tests  Shoulder: From Dr. Black's note of 7/9/24: Impingement signs are mildly positive with Neers, Garcia and crossover. Instability tests are negative with anterior and posterior drawer, sulcus, and apprehension with relocation test. Speeds test is negative. [place]'s test is negative. Supraspinatus is mildly weak, but rotator cuff strength is otherwise full.    Wrist: Finkelstein's Test: L mildly positive, R negative    Treatment Education: home exercise program, plan of care, activity modification, pain management, and pertinent anatomy     Modalities: Moist heat to L scapula, shoulder and thorax in supine to prepare for treatment and for pain relief  Manual: Trigger point releases about all aspects of L shoulder for pain relief and increased ROM    HEP: countertop stretch combined with thread the needle (thoracic rotation and shoulder scaption, then horizontal adduction), active scapular motion (elevation, depression, retraction, combined downward rotation and retraction), L shoulder strengthening in flexion, extension, abduction, adduction, internal rotation, and external rotation with red theraband, supraspinatus strengthening with 1 lb, periscapular strengthening movements (I, T, Y positions) over therapy ball, foam roller stretches; each x 2 sets of 10, 2x/day.     OT Therapeutic Procedures Time Entry  Manual Therapy Time Entry: 35  OT Modalities Time Entry  Hot/Cold Pack Time Entry: 15 (not charged)

## 2024-09-04 ENCOUNTER — APPOINTMENT (OUTPATIENT)
Dept: OCCUPATIONAL THERAPY | Facility: HOSPITAL | Age: 79
End: 2024-09-04
Payer: MEDICARE

## 2024-09-11 ENCOUNTER — APPOINTMENT (OUTPATIENT)
Dept: OCCUPATIONAL THERAPY | Facility: HOSPITAL | Age: 79
End: 2024-09-11
Payer: MEDICARE

## 2024-09-18 ENCOUNTER — TREATMENT (OUTPATIENT)
Dept: OCCUPATIONAL THERAPY | Facility: HOSPITAL | Age: 79
End: 2024-09-18
Payer: MEDICARE

## 2024-09-18 DIAGNOSIS — M75.82 ROTATOR CUFF TENDONITIS, LEFT: Primary | ICD-10-CM

## 2024-09-18 PROCEDURE — 97110 THERAPEUTIC EXERCISES: CPT | Mod: GO

## 2024-09-18 PROCEDURE — 97140 MANUAL THERAPY 1/> REGIONS: CPT | Mod: GO

## 2024-09-18 NOTE — PROGRESS NOTES
Occupational Therapy Treatment          M E D I C A R E   R E C E R T I F I C A T I O N    Patient Name: Heidi Evans  MRN: [id number]  Today's Date: 9/18/2024  Time Calculation  Start Time: 0230  Stop Time: 0315  Time Calculation (min): 45 min    Onset 3/1/24    Insurance  Visit 10 of [place]  Authorization: not required  Insurance plan: [employer]  Medicare Certification: 6/11/24 - 9/3/24  Medicare Recertification: 9/4/24 - 11/27/24    Assessment: Heidi's L shoulder strength has improved. She is making progress toward all of her goals.    Plan: focus on strengthening  Goals  In 6-8 weeks, Heidi will achieve the following goals  She will be able to perform self-care, household, work, and leisure tasks with lower pain (0-1/10), 75% of the time.  She will attain 5/5 left shoulder strength in order to fully perform self-care, household, work, and leisure tasks.   She will attain 5/5 left elbow/forearm strength in order to fully perform self-care, household, work and or leisure tasks.  Patient's goals for therapy: Get rid of the L shoulder pain because she had no pain before    Plan of care was developed with input and agreement by the patient  Frequency: 1 x Week  Duration: 12 weeks    Subjective   Still a bit of pain in L axilla and distal upper arm, not daily. Still has not done the foam roller or exercise ball stretches.     Pain  None at rest; low when flexing shoulder against resistance    Objective   Outcome Measure: QuickDASH: 0    Edema none    Sensation WNL    AROM    Shoulder:   flexion 134, R 144  extension 65    abduction L 152, R 158  external rotation 75   internal rotation L T8, R T8    L elbow/wrist/forearm/hand WNL     Strength LUE  Shoulder:   abduction 5/5  external rotation with shoulder at side 5/5  internal rotation with shoulder at side 5/5  flexion 4+/5  extension 5/5    Elbow:  flexion 5/5  extension 4+/5  supination 4+/5  pronation 4+/5      Special Tests  Shoulder: From Dr. Black's note of  7/9/24: Impingement signs are mildly positive with Neers, Garcia and crossover. Instability tests are negative with anterior and posterior drawer, sulcus, and apprehension with relocation test. Speeds test is negative. [place]'s test is negative. Supraspinatus is mildly weak, but rotator cuff strength is otherwise full.    Wrist: Finkelstein's Test: L mildly positive, R negative    Treatment   Education: home exercise program, plan of care, activity modification, pain management, and pertinent anatomy     Therapeutic Exercise: Reassessed AROM, strength, symptoms, functional status, HEP and compliance.  UBE for gripping and general UE strengthening, level 2 x 3 min in each direction. Pulley for shoulder stretching in shoulder flexion, abduction, and IR with isometric holds at the top x 10 each.  Wall wiping with LUE using shoulder flexion, horizontal ab/dduction, then with her back to the wall she used shoulder abduction, horizontal ab/duction and external rotation x 10 each  Manual: Trigger point releases about all aspects of L shoulder for pain relief and increased ROM    HEP: countertop stretch combined with thread the needle (thoracic rotation and shoulder scaption, then horizontal adduction), active scapular motion (elevation, depression, retraction, combined downward rotation and retraction), L shoulder strengthening in flexion, extension, abduction, adduction, internal rotation, and external rotation with red theraband, supraspinatus strengthening with 1 lb, periscapular strengthening movements (I, T, Y positions) over therapy ball, foam roller stretches; each x 2 sets of 10, 2x/day.     OT Therapeutic Procedures Time Entry  Manual Therapy Time Entry: 15  Therapeutic Exercise Time Entry: 30

## 2024-09-25 ENCOUNTER — TREATMENT (OUTPATIENT)
Dept: OCCUPATIONAL THERAPY | Facility: HOSPITAL | Age: 79
End: 2024-09-25
Payer: MEDICARE

## 2024-09-25 DIAGNOSIS — M75.82 ROTATOR CUFF TENDONITIS, LEFT: Primary | ICD-10-CM

## 2024-09-25 PROCEDURE — 97110 THERAPEUTIC EXERCISES: CPT | Mod: GO

## 2024-09-25 NOTE — PROGRESS NOTES
Occupational Therapy Treatment              Patient Name: Hiedi Evans  MRN: [id number]  Today's Date: 9/25/2024  Time Calculation  Start Time: 0900  Stop Time: 0945  Time Calculation (min): 45 min    Onset 3/1/24    Insurance  Visit 11 of [place]  Authorization: not required  Insurance plan: [employer] Medicare  Medicare Certification: 6/11/24 - 9/3/24  Medicare Recertification: 9/4/24 - 11/27/24    Assessment: Heidi still has positive Neer sign for L shoulder impingement, though Garcia is negative. It may be because she is doing her home program only twice per week.    Plan: focus on strengthening  Goals  By discharge, Heidi will achieve the following goals  She will be able to perform self-care, household, work, and leisure tasks with lower pain (0-1/10), 75% of the time.  She will attain 5/5 left shoulder strength in order to fully perform self-care, household, work, and leisure tasks.   She will attain 5/5 left elbow/forearm strength in order to fully perform self-care, household, work and or leisure tasks.  Patient's goals for therapy: Get rid of the L shoulder pain because she had no pain before    Plan of care was developed with input and agreement by the patient  Frequency: 1 x Week  Duration: 12 weeks    Subjective   Sore at distal lateral upper arm last night and now. Still has not done the foam roller or exercise ball stretches. Doing HEP twice a week    Pain  Intermittent soreness at lateral L upper arm; number given    Objective   Outcome Measure: QuickDASH: 0    Edema none    Sensation WNL    AROM    Shoulder:   flexion L 134, R 144  extension L 65    abduction L 152, R 158  external rotation L 75   internal rotation L T8, R T8    L elbow/wrist/forearm/hand WNL     Strength LUE  Shoulder:   abduction 5/5  external rotation with shoulder at side 5/5  internal rotation with shoulder at side 5/5  flexion 4+/5  extension 5/5    Elbow:  flexion 5/5  extension 4+/5  supination 4+/5  pronation 4+/5      Special  Tests  Shoulder: L positive Neers (pain only at end range flexion), negative Garcia    Wrist: Finkelstein's Test: L mildly positive, R negative    Treatment   Education: home exercise program, plan of care, activity modification, pain management, and pertinent anatomy     Modalities: Moist heat to L shoulder in sitting to prepare for treatment  Therapeutic Exercise: Reassessed symptoms, functional status, HEP and compliance.  Wall walking for shoulder flexion and abduction stretching followed by taking hand off the wall and maintaining arm position x 10 each. Reviewed prone therapy ball periscapular strengthening in I, T, and Y positions, which she performed correctly, but the T was painful, so I didn't have her do the Y position. I then had her do supraspinatus strengthening with 3 lbs (up 1 lb from HEP), but it was painful, so I had her do elbow flexion/supination/pronation strengthening with the 3 lbs.   I asked her to describe her current HEP but she could not remember. We reviewed the entire HEP (see below) and pared it down to: countertop flexion stretch and thread the needle, active scapular motion, shoulder flexion strengthening with red/green theraband, each x 10 reps 3 times a week, and periscapular strengthening in I, T, (Y) positions over her therapy ball, each x 10 reps 1 time a week    CURRENT HEP: countertop flexion stretch and thread the needle, active scapular motion, shoulder flexion strengthening with red/green theraband, each x 10 reps 3 times a week, and periscapular strengthening in I, T, (Y) positions over her therapy ball, each x 10 reps 1 time a week    ENTIRE HEP: countertop stretch combined with thread the needle (thoracic rotation and shoulder scaption, then horizontal adduction), active scapular motion (elevation, depression, retraction, combined downward rotation and retraction), L shoulder strengthening in flexion, extension, abduction, adduction, internal rotation, and external  rotation with red theraband, supraspinatus strengthening with 1 lb, periscapular strengthening movements (I, T, Y positions) over therapy ball, foam roller stretches; each x 2 sets of 10, 2x/day.     OT Therapeutic Procedures Time Entry  Therapeutic Exercise Time Entry: 33  OT Modalities Time Entry  Hot/Cold Pack Time Entry: 12

## 2024-10-04 ENCOUNTER — TREATMENT (OUTPATIENT)
Dept: OCCUPATIONAL THERAPY | Facility: HOSPITAL | Age: 79
End: 2024-10-04
Payer: MEDICARE

## 2024-10-04 DIAGNOSIS — M75.82 ROTATOR CUFF TENDONITIS, LEFT: ICD-10-CM

## 2024-10-04 PROCEDURE — 97110 THERAPEUTIC EXERCISES: CPT | Mod: GO,KX

## 2024-10-04 NOTE — PROGRESS NOTES
"Occupational Therapy Treatment              Patient Name: Heidi Evans  MRN: [id number]  Today's Date: 10/4/2024  Time Calculation  Start Time: 0910  Stop Time: 0945  Time Calculation (min): 35 min    Onset 3/1/24    Insurance  Visit 13 of [place]  Authorization: not required  Insurance plan: [employer]  Medicare Certification: 6/11/24 - 9/3/24  Medicare Recertification: 9/4/24 - 11/27/24    Assessment: Heidi has good motion and strength, but still has positive Neer and Garcia signs for L shoulder impingement.    Plan: add chest stretch, focus on periscapular strengthening  Goals  By discharge, Heidi will achieve the following goals  She will be able to perform self-care, household, work, and leisure tasks with lower pain (0-1/10), 75% of the time.  She will attain 5/5 left shoulder strength in order to fully perform self-care, household, work, and leisure tasks.   She will attain 5/5 left elbow/forearm strength in order to fully perform self-care, household, work and or leisure tasks.  Patient's goals for therapy: Get rid of the L shoulder pain because she had no pain before    Plan of care was developed with input and agreement by the patient  Frequency: 1 x Week  Duration: 12 weeks    Subjective   L shoulder bothers her more than half the time. She did \"some\" exercise since last visit. Very busy with work and managing her mother's affairs.    Pain  Intermittent soreness at lateral and posterior L upper arm when reaching behind her head to the right shoulder. May try Voltaren    Objective   Outcome Measure: QuickDASH: 0    Sensation WNL    Posture: forward shoulders, slight winging of bilateral scapulae    AROM    Shoulder:   flexion L 134, R 144  extension L 72    abduction L 155, R 158  external rotation L 75   internal rotation L T8, R T8    L elbow/wrist/forearm/hand WNL     Strength LUE  Shoulder:   abduction 5/5  external rotation with shoulder at side 5/5  internal rotation with shoulder at side " 5/5  flexion 4+/5  extension 5/5    Elbow:  flexion 5/5  extension 4+/5  supination 4+/5  pronation 4+/5    Special Tests  Shoulder: L positive Neers (pain only at end range flexion), positive Garcia, positive [place]riens IR > ER, no instability    Wrist: Finkelstein's Test: L mildly positive, R negative    Treatment   Education: home exercise program, plan of care, activity modification, pain management, and pertinent anatomy     Modalities: Moist heat to L shoulder and thorax in sitting to prepare for treatment  Therapeutic Exercise: Reassessed AROM, strength, symptoms, functional status, HEP and compliance.  Reviewed prone (without therapy ball) periscapular strengthening in I and T positions, which I demonstrated and she performed correctly.    CURRENT HEP: countertop flexion stretch and thread the needle, active scapular motion, shoulder flexion strengthening with red/green theraband, each x 10 reps 3 times a week, and periscapular strengthening in I, T, (Y) positions over her therapy ball or prone, each x 10 reps 1 time a week    ENTIRE HEP: countertop stretch combined with thread the needle (thoracic rotation and shoulder scaption, then horizontal adduction), active scapular motion (elevation, depression, retraction, combined downward rotation and retraction), L shoulder strengthening in flexion, extension, abduction, adduction, internal rotation, and external rotation with red theraband, supraspinatus strengthening with 1 lb, periscapular strengthening movements (I, T, Y positions) over therapy ball, foam roller stretches; each x 2 sets of 10, 2x/day.     OT Therapeutic Procedures Time Entry  Therapeutic Exercise Time Entry: 25  OT Modalities Time Entry  Hot/Cold Pack Time Entry: 10

## 2024-10-11 ENCOUNTER — TREATMENT (OUTPATIENT)
Dept: OCCUPATIONAL THERAPY | Facility: HOSPITAL | Age: 79
End: 2024-10-11
Payer: MEDICARE

## 2024-10-11 DIAGNOSIS — M75.82 ROTATOR CUFF TENDONITIS, LEFT: ICD-10-CM

## 2024-10-11 PROCEDURE — 97110 THERAPEUTIC EXERCISES: CPT | Mod: GO

## 2024-10-11 NOTE — PROGRESS NOTES
Occupational Therapy Treatment              Patient Name: Heidi Evans  MRN: [id number]  Today's Date: 10/11/2024  Time Calculation  Start Time: 0900  Stop Time: 0945  Time Calculation (min): 45 min    Onset 3/1/24    Insurance  Visit 14 of [place]  Authorization: not required  Insurance plan: [employer]  Medicare Certification: 6/11/24 - 9/3/24  Medicare Recertification: 9/4/24 - 11/27/24    Assessment: Ongoing pain with left shoulder scaption/abduction.    Plan: add chest stretch, focus on periscapular strengthening  Goals  By discharge, Heidi will achieve the following goals  She will be able to perform self-care, household, work, and leisure tasks with lower pain (0-1/10), 75% of the time.  She will attain 5/5 left shoulder strength in order to fully perform self-care, household, work, and leisure tasks.   She will attain 5/5 left elbow/forearm strength in order to fully perform self-care, household, work and or leisure tasks.  Patient's goals for therapy: Get rid of the L shoulder pain because she had no pain before    Plan of care was developed with input and agreement by the patient  Frequency: 1 x Week  Duration: 12 weeks    Subjective   L shoulder bothers her less since last visit. She has worked on it more.    Pain  Intermittent soreness at lateral and posterior L upper arm when reaching behind her head to the right shoulder. May try Voltaren    Objective   Outcome Measure: QuickDASH: 0    Sensation WNL    Posture: forward shoulders, slight winging of bilateral scapulae    AROM    Shoulder:   flexion L 134, R 144  extension L 72    abduction L 155, R 158  external rotation L 75   internal rotation L T8, R T8    L elbow/wrist/forearm/hand WNL     Strength LUE  Shoulder:   abduction 5/5  external rotation with shoulder at side 5/5  internal rotation with shoulder at side 5/5  flexion 4+/5  extension 5/5    Elbow:  flexion 5/5  extension 4+/5  supination 4+/5  pronation 4+/5    Special Tests  Shoulder: L  positive Neers (pain only at end range flexion), positive Garcia, positive O'Briens IR > ER, no instability    Wrist: Finkelstein's Test: L mildly positive, R negative    Treatment   Education: home exercise program, plan of care, activity modification, pain management, and pertinent anatomy     Modalities: Moist heat to L shoulder and thorax in sitting to prepare for treatment  Therapeutic Exercise: Reassessed AROM, eHidi laid on a foam roller aligned with her spine and abducted her shoulders to various degrees for chest stretches, then actively flexed/extended her shoulders, internally/externally rotated them and abducted them, bringing her hands together above her head; with 2 lbs in each hand she did bear hugs; could not tolerate the weight in her L hand for alternating shoulder flex/ext and alternating diagonals; with medium therapy ball: she tossed it up and caught it x 20, bilateral shoulder flex/ext x 20, diagonals x 20    CURRENT HEP: countertop flexion stretch and thread the needle, active scapular motion, shoulder flexion strengthening with red/green theraband, each x 10 reps 3 times a week, and periscapular strengthening in I, T, (Y) positions over her therapy ball or prone, each x 10 reps 1 time a week    ENTIRE HEP: countertop stretch combined with thread the needle (thoracic rotation and shoulder scaption, then horizontal adduction), active scapular motion (elevation, depression, retraction, combined downward rotation and retraction), L shoulder strengthening in flexion, extension, abduction, adduction, internal rotation, and external rotation with red theraband, supraspinatus strengthening with 1 lb, periscapular strengthening movements (I, T, Y positions) over therapy ball, foam roller stretches; each x 2 sets of 10, 2x/day.     OT Therapeutic Procedures Time Entry  Therapeutic Exercise Time Entry: 33  OT Modalities Time Entry  Hot/Cold Pack Time Entry: 12

## 2024-10-16 ENCOUNTER — TREATMENT (OUTPATIENT)
Dept: OCCUPATIONAL THERAPY | Facility: HOSPITAL | Age: 79
End: 2024-10-16
Payer: MEDICARE

## 2024-10-16 DIAGNOSIS — M75.82 ROTATOR CUFF TENDONITIS, LEFT: ICD-10-CM

## 2024-10-16 PROCEDURE — 97110 THERAPEUTIC EXERCISES: CPT | Mod: GO

## 2024-10-16 NOTE — PROGRESS NOTES
Occupational Therapy Treatment/ Discharge Note              Patient Name: Heidi Evans  MRN: [id number]  Today's Date: 10/16/2024  Time Calculation  Start Time: 0900  Stop Time: 0945  Time Calculation (min): 45 min    Onset 3/1/24    Insurance  Visit 16 of [place]  Authorization: not required  Insurance plan: [employer]  Medicare Certification: 6/11/24 - 9/3/24  Medicare Recertification: 9/4/24 - 11/27/24    Assessment: Heidi feels that she has come a long way and has much less pain than she did when she started OT. She will continue working on her home program independently.    Plan: discharge to home program today  Goals  Heidi has met or nearly met all of her goals.  She will be able to perform self-care, household, work, and leisure tasks with lower pain (0-1/10), 75% of the time.  She will attain 5/5 left shoulder strength in order to fully perform self-care, household, work, and leisure tasks.   She will attain 5/5 left elbow/forearm strength in order to fully perform self-care, household, work and or leisure tasks.  Patient's goals for therapy: Get rid of the L shoulder pain because she had no pain before    Plan of care was developed with input and agreement by the patient    Subjective   Heidi has decided to stop coming to therapy and work on her L shoulder independently. She feels she has come a long way.    Pain  Intermittent soreness at lateral and posterior L upper arm when reaching behind her head to the right shoulder. May try Voltaren    Objective   Outcome Measure: QuickDASH: 0    Sensation WNL    Posture: forward shoulders, slight winging of bilateral scapulae    AROM    Shoulder:   flexion L 140, R 144  extension L 72    abduction L 160, R 158  external rotation L 75   internal rotation L T8, R T8    L elbow/wrist/forearm/hand WNL     Strength LUE  Shoulder:   abduction 5/5  external rotation with shoulder at side 5/5  internal rotation with shoulder at side 5/5  flexion 5/5  extension  5/5    Elbow:  flexion 5/5  extension 5/5  supination 4+/5  pronation 4+/5    Treatment   Education: home exercise program, plan of care, activity modification, pain management, and pertinent anatomy     Modalities: Moist heat to L shoulder and thorax in sitting to prepare for treatment  Therapeutic Exercise: Reassessed AROM, strength, symptoms, HEP and compliance.  With medium therapy ball: rolled it up the wall for full flexion stretch bilaterally, then in abduction with LUE only, tiny circles in both directions for stabilization strengthening, wall push ups into the ball, then she stabilized the ball on the floor in front of her chair with her RUE while I tried to knock it away from her, and then we reversed rolls. She will do these same therapy ball exercises at home. UBE for gripping and general UE strengthening, level 3 x 2 min in each direction.     CURRENT HEP: countertop flexion stretch and thread the needle, active scapular motion, shoulder flexion strengthening with red/green theraband, each x 10 reps 3 times a week, and periscapular strengthening in I, T, (Y) positions over her therapy ball or prone, each x 10 reps 1 time a week    ENTIRE HEP: countertop stretch combined with thread the needle (thoracic rotation and shoulder scaption, then horizontal adduction), active scapular motion (elevation, depression, retraction, combined downward rotation and retraction), L shoulder strengthening in flexion, extension, abduction, adduction, internal rotation, and external rotation with red theraband, supraspinatus strengthening with 1 lb, periscapular strengthening movements (I, T, Y positions) over therapy ball, foam roller stretches; each x 2 sets of 10, 2x/day.     OT Therapeutic Procedures Time Entry  Therapeutic Exercise Time Entry: 33  OT Modalities Time Entry  Hot/Cold Pack Time Entry: 12 (not charged)

## 2024-12-06 ENCOUNTER — TELEPHONE (OUTPATIENT)
Dept: OBSTETRICS AND GYNECOLOGY | Facility: CLINIC | Age: 79
End: 2024-12-06
Payer: MEDICARE

## 2024-12-06 NOTE — TELEPHONE ENCOUNTER
Left message for pt to call office to schedule prolia in January after the 9th.  Please schedule pt  Belinda Deluna MA

## 2024-12-17 ENCOUNTER — APPOINTMENT (OUTPATIENT)
Dept: OBSTETRICS AND GYNECOLOGY | Facility: CLINIC | Age: 79
End: 2024-12-17
Payer: MEDICARE

## 2024-12-17 VITALS
BODY MASS INDEX: 26.22 KG/M2 | DIASTOLIC BLOOD PRESSURE: 60 MMHG | SYSTOLIC BLOOD PRESSURE: 110 MMHG | HEIGHT: 63 IN | WEIGHT: 148 LBS

## 2024-12-17 DIAGNOSIS — Z12.31 ENCOUNTER FOR SCREENING MAMMOGRAM FOR MALIGNANT NEOPLASM OF BREAST: ICD-10-CM

## 2024-12-17 DIAGNOSIS — Z01.419 ENCOUNTER FOR GYNECOLOGICAL EXAMINATION WITHOUT ABNORMAL FINDING: ICD-10-CM

## 2024-12-17 DIAGNOSIS — M81.0 AGE RELATED OSTEOPOROSIS, UNSPECIFIED PATHOLOGICAL FRACTURE PRESENCE: Primary | ICD-10-CM

## 2024-12-17 PROCEDURE — 1159F MED LIST DOCD IN RCRD: CPT | Performed by: OBSTETRICS & GYNECOLOGY

## 2024-12-17 PROCEDURE — G2211 COMPLEX E/M VISIT ADD ON: HCPCS | Performed by: OBSTETRICS & GYNECOLOGY

## 2024-12-17 PROCEDURE — 99214 OFFICE O/P EST MOD 30 MIN: CPT | Performed by: OBSTETRICS & GYNECOLOGY

## 2024-12-17 RX ORDER — VITAMIN E (DL,TOCOPHERYL ACET) 180 MG
CAPSULE ORAL
COMMUNITY

## 2024-12-17 RX ORDER — MULTIVITAMIN WITH IRON
TABLET ORAL
COMMUNITY

## 2024-12-17 RX ORDER — CEPHALEXIN 250 MG
1 CAPSULE ORAL EVERY 24 HOURS
COMMUNITY

## 2024-12-17 RX ORDER — IBUPROFEN 100 MG/5ML
SUSPENSION, ORAL (FINAL DOSE FORM) ORAL
COMMUNITY

## 2024-12-17 RX ORDER — LACTOBACILLUS COMBINATION NO.4 3B CELL
CAPSULE ORAL
COMMUNITY

## 2024-12-17 RX ORDER — PSYLLIUM HUSK 0.4 G
CAPSULE ORAL
COMMUNITY

## 2024-12-17 NOTE — PROGRESS NOTES
Subjective   Heidi Evans is a 79 y.o. female here for GYN care.  She has no postmenopausal bleeding or discharge.  She has a history of a hysterectomy.    No dysuria but there are some overactive bladder symptoms.  No stress incontinence.  Her bowels are normal.  She is current on her colonoscopy.    A bone density in 2023 showed osteopenia, T-score -2.4.  This was a 4.4 % increase from the previous study.  She is on Prolia.    Personal health questionnaire reviewed: yes.     Gynecologic History  No LMP recorded (lmp unknown). Patient is postmenopausal.  Contraception: status post hysterectomy  Last Pap: 2012. Results were: normal  Last mammogram: 24. Results were: normal    Obstetric History  OB History    Para Term  AB Living   2 2 2         SAB IAB Ectopic Multiple Live Births                  # Outcome Date GA Lbr Manfred/2nd Weight Sex Type Anes PTL Lv   2 Term            1 Term                Objective   Constitutional: Alert and in no acute distress. Well developed, well nourished.   Head and Face: Head and face: Normal.    Eyes: Normal external exam - nonicteric sclera, extraocular movements intact (EOMI) and no ptosis.   Neck: No neck asymmetry. Supple. Thyroid not enlarged and there were no palpable thyroid nodules.    Pulmonary: No respiratory distress.   Chest: Breasts: Normal appearance, no nipple discharge and no skin changes. Palpation of breasts and axillae: No palpable mass and no axillary lymphadenopathy.   Abdomen: Soft nontender; no abdominal mass palpated. No organomegaly. No hernias.   Genitourinary: External genitalia: Normal. No inguinal lymphadenopathy. Bartholin's Urethral and Skenes Glands: Normal. Urethra: Normal.  Bladder: Normal on palpation. Vagina: Normal. Cervix: Absent.  Uterus: absent.  Right Adnexa/parametria: Normal.  Left Adnexa/parametria: Normal.  Inspection of Perianal Area: Normal.   Musculoskeletal: No joint swelling seen, normal movements of all  extremities.   Skin: Normal skin color and pigmentation, normal skin turgor, and no rash.   Neurologic: Non-focal. Grossly intact.   Psychiatric: Alert and oriented x 3. Affect normal to patient baseline. Mood: Appropriate.  Physical Exam     Assessment/Plan   This is a 79-year-old female with a history of a hysterectomy.  She has osteoporosis, and is on Prolia.  Her bone density is due in April 2025.    No Pap was sent, her Pap was normal in 2012 and no further Paps are needed.    Her routine mammogram will be due in April 2025.    She is due for her next Prolia in January 2025.    I will see her in 1 year.    Education reviewed: self breast exams.  Mammogram ordered.

## 2024-12-19 ENCOUNTER — TELEPHONE (OUTPATIENT)
Dept: OBSTETRICS AND GYNECOLOGY | Facility: CLINIC | Age: 79
End: 2024-12-19
Payer: MEDICARE

## 2025-01-20 ENCOUNTER — APPOINTMENT (OUTPATIENT)
Dept: OBSTETRICS AND GYNECOLOGY | Facility: CLINIC | Age: 80
End: 2025-01-20
Payer: MEDICARE

## 2025-01-21 ENCOUNTER — APPOINTMENT (OUTPATIENT)
Dept: OBSTETRICS AND GYNECOLOGY | Facility: CLINIC | Age: 80
End: 2025-01-21
Payer: MEDICARE

## 2025-01-21 DIAGNOSIS — M81.0 AGE RELATED OSTEOPOROSIS, UNSPECIFIED PATHOLOGICAL FRACTURE PRESENCE: Primary | ICD-10-CM

## 2025-01-21 PROCEDURE — 96372 THER/PROPH/DIAG INJ SC/IM: CPT | Performed by: OBSTETRICS & GYNECOLOGY

## 2025-02-25 ENCOUNTER — TELEPHONE (OUTPATIENT)
Facility: CLINIC | Age: 80
End: 2025-02-25
Payer: MEDICARE

## 2025-02-25 NOTE — TELEPHONE ENCOUNTER
Called patient to schedule her next prolia due in July. Last prolia was 1/21/25. Please put **our stock** in appointment note.    English Weiner

## 2025-04-14 ENCOUNTER — HOSPITAL ENCOUNTER (OUTPATIENT)
Dept: RADIOLOGY | Facility: CLINIC | Age: 80
Discharge: HOME | End: 2025-04-14
Payer: MEDICARE

## 2025-04-14 DIAGNOSIS — M81.0 AGE RELATED OSTEOPOROSIS, UNSPECIFIED PATHOLOGICAL FRACTURE PRESENCE: ICD-10-CM

## 2025-04-14 PROCEDURE — 77080 DXA BONE DENSITY AXIAL: CPT

## 2025-04-14 PROCEDURE — 77080 DXA BONE DENSITY AXIAL: CPT | Performed by: RADIOLOGY

## 2025-04-15 NOTE — RESULT ENCOUNTER NOTE
The patient is not active in [employer].  The bone density shows osteopenia of the left hip and spine.  She is on Prolia.  The bone density has improved 3.2% in the left hip, and 2.9% in the spine since her last study.  I recommend continuing Prolia.  I also recommend dietary calcium, vitamin D supplement and weightbearing exercise.  The next bone density test is in 2 years.

## 2025-04-16 ENCOUNTER — TELEPHONE (OUTPATIENT)
Facility: CLINIC | Age: 80
End: 2025-04-16
Payer: MEDICARE

## 2025-04-16 NOTE — TELEPHONE ENCOUNTER
Patient notified of bone density results and recommendations. Patient verbalized understanding. She is scheduled for her next prolia 7/2025.    English Weiner

## 2025-04-16 NOTE — TELEPHONE ENCOUNTER
----- Message from Jessica San sent at 4/15/2025 12:19 AM EDT -----  The patient is not active in [employer].  The bone density shows osteopenia of the left hip and spine.  She is on Prolia.  The bone density has improved 3.2% in the left hip, and 2.9% in the spine since her last study.  I recommend continuing Prolia.  I also recommend dietary calcium, vitamin D supplement and weightbearing exercise.  The next bone density test is in 2 years.

## 2025-04-30 ENCOUNTER — HOSPITAL ENCOUNTER (OUTPATIENT)
Dept: RADIOLOGY | Facility: HOSPITAL | Age: 80
Discharge: HOME | End: 2025-04-30
Payer: MEDICARE

## 2025-04-30 VITALS — WEIGHT: 148 LBS | HEIGHT: 63 IN | BODY MASS INDEX: 26.22 KG/M2

## 2025-04-30 DIAGNOSIS — Z12.31 ENCOUNTER FOR SCREENING MAMMOGRAM FOR MALIGNANT NEOPLASM OF BREAST: ICD-10-CM

## 2025-04-30 DIAGNOSIS — Z01.419 ENCOUNTER FOR GYNECOLOGICAL EXAMINATION WITHOUT ABNORMAL FINDING: ICD-10-CM

## 2025-04-30 PROCEDURE — 77067 SCR MAMMO BI INCL CAD: CPT | Performed by: RADIOLOGY

## 2025-04-30 PROCEDURE — 77063 BREAST TOMOSYNTHESIS BI: CPT | Performed by: RADIOLOGY

## 2025-04-30 PROCEDURE — 77067 SCR MAMMO BI INCL CAD: CPT

## 2025-06-16 DIAGNOSIS — M81.0 AGE RELATED OSTEOPOROSIS, UNSPECIFIED PATHOLOGICAL FRACTURE PRESENCE: Primary | ICD-10-CM

## 2025-07-11 ENCOUNTER — TELEPHONE (OUTPATIENT)
Facility: CLINIC | Age: 80
End: 2025-07-11
Payer: MEDICARE

## 2025-07-11 NOTE — TELEPHONE ENCOUNTER
LVM for patient that she needs to have BMP and Vit D drawn before her next prolia injection on 7/21/25    English Husam

## 2025-07-15 LAB
25(OH)D3+25(OH)D2 SERPL-MCNC: 65 NG/ML (ref 30–100)
ANION GAP SERPL CALCULATED.4IONS-SCNC: 7 MMOL/L (CALC) (ref 7–17)
BUN SERPL-MCNC: 14 MG/DL (ref 7–25)
BUN/CREAT SERPL: NORMAL (CALC) (ref 6–22)
CALCIUM SERPL-MCNC: 9.1 MG/DL (ref 8.6–10.4)
CHLORIDE SERPL-SCNC: 109 MMOL/L (ref 98–110)
CO2 SERPL-SCNC: 25 MMOL/L (ref 20–32)
CREAT SERPL-MCNC: 0.65 MG/DL (ref 0.6–1)
EGFRCR SERPLBLD CKD-EPI 2021: 90 ML/MIN/1.73M2
GLUCOSE SERPL-MCNC: 90 MG/DL (ref 65–99)
POTASSIUM SERPL-SCNC: 4.5 MMOL/L (ref 3.5–5.3)
SODIUM SERPL-SCNC: 141 MMOL/L (ref 135–146)

## 2025-07-21 ENCOUNTER — APPOINTMENT (OUTPATIENT)
Facility: CLINIC | Age: 80
End: 2025-07-21
Payer: MEDICARE

## 2025-07-21 DIAGNOSIS — M81.0 AGE RELATED OSTEOPOROSIS, UNSPECIFIED PATHOLOGICAL FRACTURE PRESENCE: ICD-10-CM

## 2025-07-21 PROCEDURE — 96372 THER/PROPH/DIAG INJ SC/IM: CPT | Performed by: OBSTETRICS & GYNECOLOGY

## 2025-07-21 NOTE — PROGRESS NOTES
- Doing well on minimal vent settings  - due to suspected Aspiration PNA will postpone extubation  -Watch secretions, saturation and try SBT in AM   Patient is here for Prolia Injection.  Last injection was given 1/21/2025.  Injection given left arm today.  Patient voices no questions or concerns.  Patient advised to schedule an appointment for 6 months for next injection.  Medication was taken from office supply.  Lot:  [id number].  Exp:  12/31/2027.  NDC:  17342-442-19.  LANDRY Croft LPN

## 2025-12-22 ENCOUNTER — APPOINTMENT (OUTPATIENT)
Dept: OBSTETRICS AND GYNECOLOGY | Facility: CLINIC | Age: 80
End: 2025-12-22
Payer: MEDICARE

## 2026-01-23 ENCOUNTER — APPOINTMENT (OUTPATIENT)
Facility: CLINIC | Age: 81
End: 2026-01-23
Payer: MEDICARE